# Patient Record
(demographics unavailable — no encounter records)

---

## 2019-03-06 NOTE — NUR
FIRST CONTACT WITH PT.



FERNIE at bedside. Pt states he fell a couple of weeks ago and he has had pain on 
his left flank and buttock since. 



Pt denies loss of bowel or bladder control, numbness, tingling, or loss of 
sensation. Pt denies cp, sob, n/v/d, or ecchymosis. Pt states he took some over 
the counter meds for pain.



All safety measures in place. Call light within reach. Pt's family at bedside. 
Pt connected to NIBP and continous pulse ox.

## 2019-03-06 NOTE — NUR
Patient given discharge instructions and they have confirmed that they 
understand the instructions.  Patient ambulatory with steady gait. Pt left with 
discharge paperwork, prescription, and all personal belongings.

## 2019-05-01 NOTE — NUR
patient now states he last took keppra yesterday, he believes 500 mg. he states 
that he has had two seizures in his life one four months ago and one two months 
ago, then today.  he doesn't recall where he was when seizure happens but is 
aox4.

## 2019-05-01 NOTE — NUR
REPORT RECEIVED FROM MONTSERRAT VAZQUEZ. ASSUMED CARE OF PT. PT RESTING ON GURNEY IN 
NAD. SEIZURE PRECAUTIONS IN PLACE. NSR ON MONITOR WITH NO ST CHANGES OR ECTOPY 
NOTED. ALL TESTING COMPLETE, AWAITING RESULTS AT THIS TIME. PT MEDICATED PER 
EMAR BY DAY SHIFT NURSE.

## 2019-05-01 NOTE — NUR
patient arrives with marychuysa after having a witnessed seizure on a city bus.  he 
didn't hit his head.  he is now calling out in pain for headache. he speaks 
Belgian.  patient is aox4, vss.  he states he has seizures and had last one two 
months ago. states he takes keppra and has been taking it.  he states that 
sometimes he has headache with seizure.  patient on monitor, seizure pads, and 
vss.

## 2019-05-01 NOTE — NUR
PT SLEEPING. RESPIRATIONS EVEN AND UNLABORED. VITALS STABLE. WILL CONTINUE TO 
MONITOR. CHART UP FOR RECHECK

## 2019-05-01 NOTE — NUR
DR. COLLINS AT BEDSIDE EVALUATING PT. PT REPORTS A RELIEF IN HA. DENIES ANY OTHER 
NEEDS AT THIS TIME. PT TO BE DISCHARGED.

## 2019-10-23 NOTE — NUR
WIFE UNABLE TO CALM DOWN, YELLING AND CURSING AT STAFF IN Malt[place]. PT AND WIFE 
ESCORTED OUT BY SECURITY.

## 2019-10-23 NOTE — NUR
WIFE VERY ANIMATED, YE[facility] AND WAVING HER 
HANDS AROUND IN FRUSTRATION.

## 2019-10-23 NOTE — NUR
MEDICAL STUDENT PERFORMING ASSESSMENT. MOBILE  USED. WIFE AT BEDSIDE 
AND SHE IS UPSET BECAUSE THIS IS THE THIRD TIME PT HAS BEEN SEEN FOR DIZZINESS 
IN THIS ED. PER WIFE, LAST TWO TIMES HE WAS SEEN HE WAS PERSCRIBED A MEDICATION 
FOR DIZZINESS AND BOTH TIMES IT DID NOT HELP.

## 2020-05-27 NOTE — NUR
DIANE RN: Patient/Caregiver given discharge instructions and they have confirmed 
that they understand the instructions.  Patient ambulatory with steady gait.

## 2020-05-27 NOTE — NUR
THIS IS A 55 YO MALE, [place] SPEAKING ONLY. MONTSERRAT VENEGAS AND NONI GONCALVES IN ROOM. 
MONTSERRAT VENEGAS USED AS . PATIENT C/O RIGHT SIDED FLANK PAIN RADIATING TO 
LUMBAR SPINE X2 DAYS, RATED 4/10, CURRENTLY TAKING TYLENOL WITH NORELIEF. 
DENIES PROBLEMS URINATING OR BOWEL MOVEMENTS. SPO2 AND BP MONITORING IN PLACE, 
FERNANDO SEVERINO AT THIS TIME, CALL LIGHT IN REACH

## 2020-06-02 NOTE — NUR
Pt returned from CT. Denies needs. VSS. Urinal placed at bedside and pt aware 
of UA. Call light in reach. Awaiting labs.

## 2020-06-22 NOTE — NUR
BREAK RN: PT STATES HE FEELS OKAY AFTER MEDS. D/C INSTRUCTIONS, MEDS & F/U APPT 
RV'WD WITH PT, HE VERBALIZES UNDERSTANDING. FRIEND AT BS, STATES SHE WILL CALL 
AN UBER FOR HER & PATIENT TO GET HOME. PT AMBULATED OUT OF ED WITHOUT 
DIFFICULTY.

## 2020-07-30 NOTE — NUR
PT TO ED WITH C/O LEFT LOWER LEG PAIN, REPORTS CHEMICAL BURN AT WORK. PT HAS 
DRY SKIN WITH LESIONS AND CELLULITIS NOTED. PT REPORTS SKIN IRRITATION AROUND 
ANKLE IS NEW. PT PT CONNECTED TO MONITORING, CALL LIGHT WITHIN REACH, PROVIDED 
WARM BLANKET.

## 2020-08-06 NOTE — NUR
PT TO ED WITH LEFT LOWER LEG SKIN PAIN. PT REPORTS USING NEOSPORIN AND TAKING 
CEPHALEXIN WITHOUT RELIEF. HAS BEEN SEEN IN THE ED BEFORE FOR SAME. PT DENIES 
ANY OTHER C/O AT THIS TIME. PT PLACED ON MONITORING, CALL LIGHT WITHIN REACH, 
ALL SAFETY MEASURES IN PLACE.

## 2020-08-10 NOTE — NUR
FIRST CONTACT WITH PT. PT HAS ALLERGIC REACTION TO CHEMICALS WHILE CLEANING  
KITCHEN. EYES SWOLLEN/ITCHY. "BENADRYL HAS NOT WORKED" DENIES VISION CHANGES. 
PT'S AOX4. RESPS EVEN AND UNLABORED. BP/SPO2 MONITORS IN PLACE. CALL LIGHT 
WITHIN REACH. EDMD AT BEDSIDE EVALUATING AT THIS TIME.

## 2020-12-30 NOTE — NUR
Patient able to void 150ml with post void residual of 11ml via bladder scan

  Urine sample sent to lab for analysis

## 2020-12-30 NOTE — NUR
WITH REASSESSMENT NO INCREASE IN DISCOMFORT

  VSS OF NIBP/POX

PIV PLACED FOR PENDING CT

 UPDATED ON ESTIMATED POC

## 2021-01-01 NOTE — NUR
PT AMBULATED TO RESTROOM WITH STEADY GAIT TO PROVIDE URINE SAMPLE. UA COLLECTED 
AND SENT TO LAB. PT CONNECTED TO MONITORING. CALL LIGHT IN REACH.

## 2021-02-06 NOTE — NUR
PT HERE WITH C/O DIZZINESS STARTED YESTERDAY AFTER GETTING OUT OF BED. PLACED 
CARDIAC AND VITALS MONITORS, FALL PRECAUTIONS IN PLACE AND CALL LIGHT PLACED 
WITHIN REACH.